# Patient Record
Sex: FEMALE | Race: WHITE | Employment: OTHER | URBAN - METROPOLITAN AREA
[De-identification: names, ages, dates, MRNs, and addresses within clinical notes are randomized per-mention and may not be internally consistent; named-entity substitution may affect disease eponyms.]

---

## 2017-04-14 PROBLEM — R07.9 CHEST PAIN: Status: ACTIVE | Noted: 2017-04-14

## 2017-04-16 PROBLEM — M89.8X1 SHOULDER BLADE PAIN: Status: ACTIVE | Noted: 2017-04-14

## 2017-11-30 ENCOUNTER — HOSPITAL ENCOUNTER (OUTPATIENT)
Dept: OTHER | Age: 77
Discharge: HOME | End: 2017-11-30
Attending: ORTHOPAEDIC SURGERY | Admitting: ORTHOPAEDIC SURGERY

## 2017-12-01 ENCOUNTER — HOSPITAL ENCOUNTER (OUTPATIENT)
Dept: OTHER | Age: 77
Discharge: HOME | End: 2017-12-01
Attending: INTERNAL MEDICINE | Admitting: INTERNAL MEDICINE

## 2017-12-11 LAB
ALBUMIN SERPL-MCNC: 3.2 GM/DL (ref 3.4–5)
ALP BLD-CCNC: 70 IU/L (ref 40–128)
ALT SERPL-CCNC: 11 U/L (ref 10–40)
ANION GAP SERPL CALCULATED.3IONS-SCNC: 13 MMOL/L (ref 4–16)
AST SERPL-CCNC: 15 IU/L (ref 15–37)
BILIRUB SERPL-MCNC: 0.7 MG/DL (ref 0–1)
BUN BLDV-MCNC: 12 MG/DL (ref 6–23)
CALCIUM SERPL-MCNC: 8.7 MG/DL (ref 8.3–10.6)
CHLORIDE BLD-SCNC: 100 MMOL/L (ref 99–110)
CO2: 26 MMOL/L (ref 21–32)
CREAT SERPL-MCNC: 0.7 MG/DL (ref 0.6–1.1)
GFR AFRICAN AMERICAN: >60 ML/MIN/1.73M2
GFR NON-AFRICAN AMERICAN: >60 ML/MIN/1.73M2
GLUCOSE BLD-MCNC: 111 MG/DL (ref 70–99)
HCT VFR BLD CALC: 31.6 % (ref 37–47)
HEMOGLOBIN: 10.3 GM/DL (ref 12.5–16)
MCH RBC QN AUTO: 30.2 PG (ref 27–31)
MCHC RBC AUTO-ENTMCNC: 32.6 % (ref 32–36)
MCV RBC AUTO: 92.7 FL (ref 78–100)
PDW BLD-RTO: 13.5 % (ref 11.7–14.9)
PLATELET # BLD: 277 K/CU MM (ref 140–440)
PMV BLD AUTO: 10.5 FL (ref 7.5–11.1)
POTASSIUM SERPL-SCNC: 4 MMOL/L (ref 3.5–5.1)
RBC # BLD: 3.41 M/CU MM (ref 4.2–5.4)
SODIUM BLD-SCNC: 139 MMOL/L (ref 135–145)
TOTAL PROTEIN: 6 GM/DL (ref 6.4–8.2)
WBC # BLD: 7.1 K/CU MM (ref 4–10.5)

## 2022-03-13 ENCOUNTER — HOSPITAL ENCOUNTER (EMERGENCY)
Age: 82
Discharge: HOME | End: 2022-03-13
Attending: EMERGENCY MEDICINE
Payer: MEDICARE

## 2022-03-13 ENCOUNTER — APPOINTMENT (OUTPATIENT)
Dept: CT IMAGING | Age: 82
End: 2022-03-13
Payer: MEDICARE

## 2022-03-13 VITALS
DIASTOLIC BLOOD PRESSURE: 98 MMHG | HEIGHT: 64 IN | RESPIRATION RATE: 18 BRPM | WEIGHT: 200 LBS | OXYGEN SATURATION: 96 % | BODY MASS INDEX: 34.15 KG/M2 | SYSTOLIC BLOOD PRESSURE: 143 MMHG | TEMPERATURE: 97.3 F | HEART RATE: 100 BPM

## 2022-03-13 DIAGNOSIS — R11.2 NON-INTRACTABLE VOMITING WITH NAUSEA, UNSPECIFIED VOMITING TYPE: Primary | ICD-10-CM

## 2022-03-13 LAB
ALBUMIN SERPL-MCNC: 4.5 GM/DL (ref 3.4–5)
ALP BLD-CCNC: 79 IU/L (ref 40–129)
ALT SERPL-CCNC: 10 U/L (ref 10–40)
ANION GAP SERPL CALCULATED.3IONS-SCNC: 15 MMOL/L (ref 4–16)
AST SERPL-CCNC: 17 IU/L (ref 15–37)
BACTERIA: NEGATIVE /HPF
BASOPHILS ABSOLUTE: 0 K/CU MM
BASOPHILS RELATIVE PERCENT: 0.1 % (ref 0–1)
BILIRUB SERPL-MCNC: 0.7 MG/DL (ref 0–1)
BILIRUBIN URINE: NEGATIVE MG/DL
BLOOD, URINE: ABNORMAL
BUN BLDV-MCNC: 13 MG/DL (ref 6–23)
CALCIUM SERPL-MCNC: 9.1 MG/DL (ref 8.3–10.6)
CAST TYPE: ABNORMAL /HPF
CHLORIDE BLD-SCNC: 104 MMOL/L (ref 99–110)
CLARITY: CLEAR
CO2: 20 MMOL/L (ref 21–32)
COLOR: YELLOW
CREAT SERPL-MCNC: 0.6 MG/DL (ref 0.6–1.1)
CRYSTAL TYPE: NEGATIVE /HPF
DIFFERENTIAL TYPE: ABNORMAL
EOSINOPHILS ABSOLUTE: 0 K/CU MM
EOSINOPHILS RELATIVE PERCENT: 0.1 % (ref 0–3)
EPITHELIAL CELLS, UA: 5 /HPF
GFR AFRICAN AMERICAN: >60 ML/MIN/1.73M2
GFR NON-AFRICAN AMERICAN: >60 ML/MIN/1.73M2
GLUCOSE BLD-MCNC: 136 MG/DL (ref 70–99)
GLUCOSE, URINE: NEGATIVE MG/DL
HCT VFR BLD CALC: 43 % (ref 37–47)
HEMOGLOBIN: 13.7 GM/DL (ref 12.5–16)
IMMATURE NEUTROPHIL %: 2.2 % (ref 0–0.43)
KETONES, URINE: NEGATIVE MG/DL
LEUKOCYTE ESTERASE, URINE: NEGATIVE
LIPASE: 18 IU/L (ref 13–60)
LYMPHOCYTES ABSOLUTE: 0.5 K/CU MM
LYMPHOCYTES RELATIVE PERCENT: 5.9 % (ref 24–44)
MCH RBC QN AUTO: 28.7 PG (ref 27–31)
MCHC RBC AUTO-ENTMCNC: 31.9 % (ref 32–36)
MCV RBC AUTO: 90 FL (ref 78–100)
MONOCYTES ABSOLUTE: 1 K/CU MM
MONOCYTES RELATIVE PERCENT: 12 % (ref 0–4)
NITRITE URINE, QUANTITATIVE: NEGATIVE
PDW BLD-RTO: 15.3 % (ref 11.7–14.9)
PH, URINE: 5.5 (ref 5–8)
PLATELET # BLD: 261 K/CU MM (ref 140–440)
PMV BLD AUTO: 10.2 FL (ref 7.5–11.1)
POTASSIUM SERPL-SCNC: 4 MMOL/L (ref 3.5–5.1)
PROTEIN UA: NEGATIVE MG/DL
RBC # BLD: 4.78 M/CU MM (ref 4.2–5.4)
RBC URINE: 15 /HPF (ref 0–6)
SEGMENTED NEUTROPHILS ABSOLUTE COUNT: 6.5 K/CU MM
SEGMENTED NEUTROPHILS RELATIVE PERCENT: 79.7 % (ref 36–66)
SODIUM BLD-SCNC: 139 MMOL/L (ref 135–145)
SPECIFIC GRAVITY UA: 1.03 (ref 1–1.03)
TOTAL IMMATURE NEUTOROPHIL: 0.18 K/CU MM
TOTAL PROTEIN: 7.8 GM/DL (ref 6.4–8.2)
TROPONIN T: <0.01 NG/ML
UROBILINOGEN, URINE: 0.2 MG/DL (ref 0.2–1)
WBC # BLD: 8.1 K/CU MM (ref 4–10.5)
WBC UA: <0 /HPF (ref 0–5)

## 2022-03-13 PROCEDURE — 85025 COMPLETE CBC W/AUTO DIFF WBC: CPT

## 2022-03-13 PROCEDURE — 2580000003 HC RX 258: Performed by: EMERGENCY MEDICINE

## 2022-03-13 PROCEDURE — 2500000003 HC RX 250 WO HCPCS: Performed by: EMERGENCY MEDICINE

## 2022-03-13 PROCEDURE — 6360000002 HC RX W HCPCS: Performed by: EMERGENCY MEDICINE

## 2022-03-13 PROCEDURE — 84484 ASSAY OF TROPONIN QUANT: CPT

## 2022-03-13 PROCEDURE — 80053 COMPREHEN METABOLIC PANEL: CPT

## 2022-03-13 PROCEDURE — 96374 THER/PROPH/DIAG INJ IV PUSH: CPT

## 2022-03-13 PROCEDURE — 74177 CT ABD & PELVIS W/CONTRAST: CPT

## 2022-03-13 PROCEDURE — 96375 TX/PRO/DX INJ NEW DRUG ADDON: CPT

## 2022-03-13 PROCEDURE — 93005 ELECTROCARDIOGRAM TRACING: CPT | Performed by: EMERGENCY MEDICINE

## 2022-03-13 PROCEDURE — 6360000004 HC RX CONTRAST MEDICATION: Performed by: EMERGENCY MEDICINE

## 2022-03-13 PROCEDURE — 83690 ASSAY OF LIPASE: CPT

## 2022-03-13 PROCEDURE — 81001 URINALYSIS AUTO W/SCOPE: CPT

## 2022-03-13 PROCEDURE — 99282 EMERGENCY DEPT VISIT SF MDM: CPT

## 2022-03-13 RX ORDER — 0.9 % SODIUM CHLORIDE 0.9 %
20 VIAL (ML) INJECTION
Status: COMPLETED | OUTPATIENT
Start: 2022-03-13 | End: 2022-03-13

## 2022-03-13 RX ORDER — ONDANSETRON 2 MG/ML
4 INJECTION INTRAMUSCULAR; INTRAVENOUS EVERY 6 HOURS PRN
Status: DISCONTINUED | OUTPATIENT
Start: 2022-03-13 | End: 2022-03-13 | Stop reason: HOSPADM

## 2022-03-13 RX ORDER — ONDANSETRON 4 MG/1
4 TABLET, ORALLY DISINTEGRATING ORAL EVERY 8 HOURS PRN
Qty: 15 TABLET | Refills: 0 | Status: SHIPPED | OUTPATIENT
Start: 2022-03-13 | End: 2022-03-13 | Stop reason: SDUPTHER

## 2022-03-13 RX ORDER — 0.9 % SODIUM CHLORIDE 0.9 %
1000 INTRAVENOUS SOLUTION INTRAVENOUS ONCE
Status: COMPLETED | OUTPATIENT
Start: 2022-03-13 | End: 2022-03-13

## 2022-03-13 RX ORDER — ONDANSETRON 4 MG/1
4 TABLET, ORALLY DISINTEGRATING ORAL EVERY 8 HOURS PRN
Qty: 15 TABLET | Refills: 0 | Status: SHIPPED | OUTPATIENT
Start: 2022-03-13

## 2022-03-13 RX ADMIN — SODIUM CHLORIDE 1000 ML: 9 INJECTION, SOLUTION INTRAVENOUS at 15:34

## 2022-03-13 RX ADMIN — ONDANSETRON 4 MG: 2 INJECTION INTRAMUSCULAR; INTRAVENOUS at 15:34

## 2022-03-13 RX ADMIN — IOPAMIDOL 75 ML: 755 INJECTION, SOLUTION INTRAVENOUS at 16:58

## 2022-03-13 RX ADMIN — FAMOTIDINE 20 MG: 10 INJECTION INTRAVENOUS at 15:33

## 2022-03-13 RX ADMIN — Medication 20 ML: at 16:59

## 2022-03-13 NOTE — Clinical Note
Agata Castro was seen and treated in our emergency department on 3/13/2022. She may return to work on 03/15/2022. If you have any questions or concerns, please don't hesitate to call.       Billie Mason MD

## 2022-03-13 NOTE — ED NOTES
Discharge instructions given per Rod Wise RN, pt voiced understanding. Escorted to discharge area without incident.       Charlee Merino RN  03/13/22 2137

## 2022-03-13 NOTE — ED PROVIDER NOTES
Emergency Department Encounter  [address]    Patient: Jacques Locke  MRN: [id number]  : 1940  Date of Evaluation: 3/13/2022  ED Provider: Aicha Miranad MD    Chief Complaint       Chief Complaint   Patient presents with    Emesis     PAT Locke is a [de-identified] y.o. female who presents to the emergency department for evaluation of nausea and vomiting. Patient reports been usual state of health until 3:00 in the morning when symptoms started. Relates reports that multiple episodes of nonbloody nonbilious vomiting since that time. Did try to take a medication for gastroesophageal reflux but was unable to keep that down. Patient denies having diarrhea, fevers, chest pain, shortness of breath or other complaints. No recent changes of diet or medications and no sick contacts. Patient denies any focal abdominal pain or discomfort. Does have history of hysterectomy appendectomy and cholecystectomy    ROS:     At least 10 systems reviewed and otherwise acutely negative except as in the [address]     Past History     Past Medical History:   Diagnosis Date    CAD (coronary artery disease)     Colon polyps 2014    Diverticulosis 2014    HTN (hypertension)     Hypothyroidism     Nausea & vomiting     Rheumatoid arthritis ([facility] [address]      Past Surgical History:   Procedure Laterality Date    APPENDECTOMY      BACK SURGERY      CHOLECYSTECTOMY      COLONOSCOPY  2014    CORONARY ANGIOPLASTY WITH STENT PLACEMENT      TONSILLECTOMY       Social History     Socioeconomic History    Marital status: Legally      Spouse name: Not on file    Number of children: Not on file    Years of education: Not on file    Highest education level: Not on file   Occupational History    Not on file   Tobacco Use    Smoking status: Former Smoker     Quit date: 10/23/1986     Years since quittin.4    Smokeless tobacco: Never Used   Substance and Sexual Activity    Alcohol use: No    Drug use: No    Sexual activity: Not on file   Other Topics Concern    Not on file   Social History Narrative    Not on file     Social Determinants of Health     Financial Resource Strain:     Difficulty of Paying Living Expenses: Not on file   Food Insecurity:     Worried About Running Out of Food in the Last Year: Not on file    Shaina of Food in the Last Year: Not on file   Transportation Needs:     Lack of Transportation (Medical): Not on file    Lack of Transportation (Non-Medical): Not on file   Physical Activity:     Days of Exercise per Week: Not on file    Minutes of Exercise per Session: Not on file   Stress:     Feeling of Stress : Not on file   Social Connections:     Frequency of Communication with Friends and Family: Not on file    Frequency of Social Gatherings with Friends and Family: Not on file    Attends Temple Services: Not on file    Active Member of [address] [place] [address] or Organizations: Not on file    Attends Club or Organization Meetings: Not on file    Marital Status: Not on file   Intimate Partner Violence:     Fear of Current or Ex-Partner: Not on file    Emotionally Abused: Not on file    Physically Abused: Not on file    Sexually Abused: Not on file   Housing Stability:     Unable to Pay for Housing in the Last Year: Not on file    Number of [place] in the Last Year: Not on file    Unstable Housing in the Last Year: Not on file       Medications/Allergies     Previous Medications    ATORVASTATIN (LIPITOR) 20 MG TABLET    Take 1 tablet by mouth nightly For cholesterol per heart doctor    CLOPIDOGREL (PLAVIX) 75 MG TABLET    Take 75 mg by mouth daily. GABAPENTIN (NEURONTIN) 300 MG CAPSULE    Take 1 capsule by mouth 3 times daily for 30 doses For neuropathic pain from muscle injury    LEVOTHYROXINE (SYNTHROID) 112 MCG TABLET    Take 200 mcg by mouth Daily     LIDOCAINE (LIDODERM) 5 %    Place 1 patch onto the skin daily 12 hours on, 12 hours off. LISINOPRIL (PRINIVIL;ZESTRIL) 10 MG TABLET    Take 20 mg by mouth daily     NAPROXEN (NAPROSYN) 375 MG TABLET    Take 1 tablet by mouth 2 times daily as needed for Pain    PANTOPRAZOLE (PROTONIX) 40 MG TABLET    Take 40 mg by mouth 2 times daily    SUCRALFATE (CARAFATE) 1 GM/10ML SUSPENSION    Take 1 g by mouth 4 times daily (before meals and nightly)    TIZANIDINE (ZANAFLEX) 2 MG TABLET    Take 1 tablet by mouth 3 times daily as needed (shoulder pain)    TRAMADOL (ULTRAM) 50 MG TABLET    Take 50 mg by mouth every 6 hours as needed for Pain. TRAMADOL (ULTRAM) 50 MG TABLET    Take 2 tablets by mouth 4 times daily as needed for Pain     Allergies   Allergen Reactions    Codeine Other (See Comments)     Made patient feel like she was having a heart attack    Phenergan [Promethazine Hcl] Other (See Comments)     Muscle spasm/ jerks    Indomethacin Er Other (See Comments)     Says they think is what gave her a hiatal hernia    Darvocet A500 [Propoxyphene N-Acetaminophen]     Demerol Hcl [Meperidine]     Fentanyl     Morphine     Percocet [Oxycodone-Acetaminophen]     Tramadol     Vicodin Hp [Hydrocodone-Acetaminophen]     Aspirin Nausea And Vomiting        Physical Exam       ED Triage Vitals [03/13/22 1524]   BP Temp Temp Source Pulse Resp SpO2 Height Weight   (!) 143/98 97.3 °F (36.3 °C) Infrared 100 18 96 % 5' 4\" (1.626 m) 200 lb (90.7 kg)     GENERAL APPEARANCE: Awake and alert. Cooperative. No acute distress. HEAD: Normocephalic. Atraumatic. EYES: Sclera anicteric. Pupils equal round reactive to light extraocular movements are intact  ENT: Tolerates saliva. No trismus. Moist mucous membranes  NECK: Supple. Trachea midline. No meningismus  CARDIO: RRR. Radial pulse 2+. No murmurs rubs or gallops appreciated  LUNGS: Respirations unlabored. CTAB. No accessory muscle usage noted. No wheezes rales rhonchi or stridor. ABDOMEN: Soft. Non-distended. Non-tender.   No tenderness in right upper quadrant or right lower quadrant to deep palpation  EXTREMITIES: No acute deformities. No unilateral leg swelling or tenderness behind either one of calves  SKIN: Warm and dry. No erythema edema or rashes appreciated  NEUROLOGICAL:  Cranial nerves II through XII grossly intact. No gross facial drooping. Moves all 4 extremities spontaneously. PSYCHIATRIC: Normal mood. Alert and oriented x3. No reported active suicidality or homicidality.     Diagnostics   Labs:  Results for orders placed or performed during the hospital encounter of 03/13/22   CBC with Auto Differential   Result Value Ref Range    WBC 8.1 4.0 - 10.5 K/CU MM    RBC 4.78 4.2 - 5.4 M/CU MM    Hemoglobin 13.7 12.5 - 16.0 GM/DL    Hematocrit 43.0 37 - 47 %    MCV 90.0 78 - 100 FL    MCH 28.7 27 - 31 PG    MCHC 31.9 (L) 32.0 - 36.0 %    RDW 15.3 (H) 11.7 - 14.9 %    Platelets 835 991 - 637 K/CU MM    MPV 10.2 7.5 - 11.1 FL    Differential Type AUTOMATED DIFFERENTIAL     Segs Relative 79.7 (H) 36 - 66 %    Lymphocytes % 5.9 (L) 24 - 44 %    Monocytes % 12.0 (H) 0 - 4 %    Eosinophils % 0.1 0 - 3 %    Basophils % 0.1 0 - 1 %    Segs Absolute 6.5 K/CU MM    Lymphocytes Absolute 0.5 K/CU MM    Monocytes Absolute 1.0 K/CU MM    Eosinophils Absolute 0.0 K/CU MM    Basophils Absolute 0.0 K/CU MM    Immature Neutrophil % 2.2 (H) 0 - 0.43 %    Total Immature Neutrophil 0.18 K/CU MM   Comprehensive Metabolic Panel w/ Reflex to MG   Result Value Ref Range    Sodium 139 135 - 145 MMOL/L    Potassium 4.0 3.5 - 5.1 MMOL/L    Chloride 104 99 - 110 mMol/L    CO2 20 (L) 21 - 32 MMOL/L    BUN 13 6 - 23 MG/DL    CREATININE 0.6 0.6 - 1.1 MG/DL    Glucose 136 (H) 70 - 99 MG/DL    Calcium 9.1 8.3 - 10.6 MG/DL    Albumin 4.5 3.4 - 5.0 GM/DL    Total Protein 7.8 6.4 - 8.2 GM/DL    Total Bilirubin 0.7 0.0 - 1.0 MG/DL    ALT 10 10 - 40 U/L    AST 17 15 - 37 IU/L    Alkaline Phosphatase 79 40 - 129 IU/L    GFR Non-African American >60 >60 mL/min/1.73m2    GFR African American >60 >60 mL/min/1.73m2    Anion Gap 15 4 - 16   Lipase   Result Value Ref Range    Lipase 18 13 - 60 IU/L   Troponin   Result Value Ref Range    Troponin T <0.010 <0.01 NG/ML   Urinalysis with Microscopic   Result Value Ref Range    Color, UA YELLOW YELLOW    Clarity, UA CLEAR CLEAR    Glucose, Urine NEGATIVE NEGATIVE MG/DL    Bilirubin Urine NEGATIVE NEGATIVE MG/DL    Ketones, Urine NEGATIVE NEGATIVE MG/DL    Specific Gravity, UA 1.026 1.001 - 1.035    Blood, Urine MODERATE NUMBER OR AMOUNT OBSERVED (A) NEGATIVE    pH, Urine 5.5 5.0 - 8.0    Protein, UA NEGATIVE NEGATIVE MG/DL    Urobilinogen, Urine 0.2 0.2 - 1.0 MG/DL    Nitrite Urine, Quantitative NEGATIVE NEGATIVE    Leukocyte Esterase, Urine NEGATIVE NEGATIVE    RBC, UA 15 (H) 0 - 6 /HPF    WBC, UA <0 (L) 0 - 5 /HPF    Epithelial Cells, UA 5 /HPF    Cast Type NO CAST FORMS SEEN NO CAST FORMS SEEN /HPF    Bacteria, UA NEGATIVE NEGATIVE /HPF    Crystal Type NEGATIVE NEGATIVE /HPF   EKG 12 Lead   Result Value Ref Range    Ventricular Rate 98 BPM    Atrial Rate 98 BPM    P-R Interval 232 ms    QRS Duration 90 ms    Q-T Interval 346 ms    QTc Calculation (Bazett) 441 ms    P Axis 30 degrees    R Axis -61 degrees    T Axis 21 degrees    Diagnosis       Sinus rhythm with 1st degree AV block  Possible Left atrial enlargement  Left anterior fascicular block  Possible Anterolateral infarct , age undetermined  Abnormal ECG  When compared with ECG of 14-APR-2017 22:59,  Borderline criteria for Anterolateral infarct are now present       Radiographs:  CT ABDOMEN PELVIS W IV CONTRAST Additional Contrast? None    Result Date: 3/13/2022  EXAMINATION: CT OF THE ABDOMEN AND PELVIS WITH CONTRAST 3/13/2022 4:45 pm TECHNIQUE: CT of the abdomen and pelvis was performed with the administration of intravenous contrast. Multiplanar reformatted images are provided for review.  Dose modulation, iterative reconstruction, and/or weight based adjustment of the mA/kV was utilized to reduce the radiation dose to as low as reasonably achievable. COMPARISON: None. HISTORY: ORDERING SYSTEM PROVIDED HISTORY: NV TECHNOLOGIST PROVIDED HISTORY: Additional Contrast?->None Reason for exam:->NV Decision Support Exception - unselect if not a suspected or confirmed emergency medical condition->Emergency Medical Condition (MA) Reason for Exam: NV Relevant Medical/Surgical History: hx hyster, appy, yfn / 75cc [id number] FINDINGS: Lower Chest: Minimal atelectasis in the visualized lung bases. Small to moderate hiatal hernia. Coronary artery calcifications. Organs: Prior cholecystectomy. No biliary ductal dilatation. Liver, pancreas, spleen, adrenal glands, and kidneys unremarkable. No hydronephrosis or urinary tract calculus. GI/Bowel: No evidence of bowel obstruction. Extensive sigmoid predominant colonic diverticulosis. No findings to suggest acute diverticulitis. Prior appendectomy. Pelvis: Urinary bladder decompressed with tiny locules of intraluminal gas likely reflecting recent instrumentation. Uterus and adnexal structures unremarkable. Peritoneum/Retroperitoneum: No free air or free fluid. Normal abdominal aortic caliber with minimal calcific atherosclerosis. No pathologically enlarged lymph node. Bones/Soft Tissues: No acute osseous or soft tissue abnormality. Small uncomplicated fat containing right inguinal hernia. Moderate to severe degenerative changes in the lumbar spine. Osteopenia. 1. No acute abdominopelvic abnormality. 2. Colonic diverticulosis. 3. Small to moderate hiatal hernia. Procedures/EKG:   Patient screening EKG is interpreted by me sinus rhythm with first-degree AV block rate 90   no ST elevation no ST depression I appreciate no acute ischemia or infarctions EKG no old EKGs with which to compare    ED Course and MDM   In brief, Myron Mcguire is a [de-identified] y.o. female who presented to the emergency department for evaluation of nausea and vomiting.   Based the patient's history and physical would be concern for possible enterovirus. Other possibilities patient symptoms do include colitis, diverticular disease atypical cardiac disease among others. Patient peers clinically quite well in no acute distress or discomfort on examination    I did review patient's imaging studies and laboratory work as noted above. On reevaluation patient was resting comfortably. I discussed the findings with patient and family at bedside. She tolerated p.o. challenge. Recommend close follow-up with primary care physician or referral physician next 24 to 48 hours. Return precautions were discussed with her include but not limited to abdominal pain, inability tolerating by mouth, shortness of breath or any other concerning symptoms she did express verbal understanding of these instructions and does feel comfortable to be discharged home    ED Medication Orders (From admission, onward)    Start Ordered     Status Ordering Provider    03/13/22 1644 03/13/22 1644  sodium chloride (PF) 0.9 % injection 20 mL  IMG ONCE PRN         Last MAR action: Given - by Jair Summers on 03/13/22 at [facility]    03/13/22 1644 03/13/22 1644  iopamidol (ISOVUE-370) 76 % injection 75 mL  IMG ONCE PRN         Last MAR action: Given - by Jair Summers on 03/13/22 at [facility]    03/13/22 1530 03/13/22 1528  0.9 % sodium chloride bolus  ONCE         Last MAR action: Stopped - by AZ ULLOA on 03/13/22 at 577 [facility]    03/13/22 1530 03/13/22 1528  famotidine (PEPCID) injection 20 mg  ONCE         Last MAR action: Given - by Michelet Failing on 03/13/22 at [facility] [facility]    03/13/22 1528 03/13/22 1528  ondansetron (ZOFRAN) injection 4 mg  EVERY 6 HOURS PRN         Last MAR action: Given - by Michelet Failing on 03/13/22 at 1534 [facility]          Final Impression      1.  Non-intractable vomiting with nausea, unspecified vomiting type      DISPOSITION           This patient was cared for in the setting of the COVID-19 pandemic, with nationwide stress on resources and staffing.     (Please note that portions of this note may have been completed with a voice recognition program. Efforts were made to edit the dictations but occasionally words are mis-transcribed.)    Neyda Rea MD  [address] Poole MD  03/13/22 0283

## 2022-03-14 LAB
EKG ATRIAL RATE: 98 BPM
EKG DIAGNOSIS: NORMAL
EKG P AXIS: 30 DEGREES
EKG P-R INTERVAL: 232 MS
EKG Q-T INTERVAL: 346 MS
EKG QRS DURATION: 90 MS
EKG QTC CALCULATION (BAZETT): 441 MS
EKG R AXIS: -61 DEGREES
EKG T AXIS: 21 DEGREES
EKG VENTRICULAR RATE: 98 BPM

## 2022-03-14 PROCEDURE — 93010 ELECTROCARDIOGRAM REPORT: CPT | Performed by: INTERNAL MEDICINE

## 2025-03-03 ENCOUNTER — INITIAL CONSULT (OUTPATIENT)
Dept: ONCOLOGY | Age: 85
End: 2025-03-03

## 2025-03-03 ENCOUNTER — HOSPITAL ENCOUNTER (OUTPATIENT)
Dept: INFUSION THERAPY | Age: 85
Discharge: HOME | End: 2025-03-03

## 2025-03-03 VITALS
BODY MASS INDEX: 32.71 KG/M2 | HEIGHT: 64 IN | WEIGHT: 191.6 LBS | OXYGEN SATURATION: 100 % | SYSTOLIC BLOOD PRESSURE: 154 MMHG | DIASTOLIC BLOOD PRESSURE: 83 MMHG | TEMPERATURE: 97.6 F | HEART RATE: 87 BPM

## 2025-03-03 DIAGNOSIS — D72.821 MONOCYTOSIS: Primary | ICD-10-CM

## 2025-03-03 DIAGNOSIS — D72.821 MONOCYTOSIS: ICD-10-CM

## 2025-03-03 LAB
ABSOLUTE BANDS: 1.41 K/UL
BANDS: 22 %
BASOPHILS # BLD: 0 K/UL
BASOPHILS NFR BLD: 0 % (ref 0–1)
EOSINOPHIL # BLD: 0.06 K/UL
EOSINOPHILS RELATIVE PERCENT: 1 % (ref 0–3)
ERYTHROCYTE [DISTWIDTH] IN BLOOD BY AUTOMATED COUNT: 16.2 % (ref 11.7–14.9)
HCT VFR BLD AUTO: 37.5 % (ref 37–47)
HGB BLD-MCNC: 11.9 G/DL (ref 12.5–16)
LDH SERPL-CCNC: 206 U/L (ref 100–190)
LYMPHOCYTES NFR BLD: 0.83 K/UL
LYMPHOCYTES RELATIVE PERCENT: 13 % (ref 24–44)
MCH RBC QN AUTO: 30.8 PG (ref 27–31)
MCHC RBC AUTO-ENTMCNC: 31.7 G/DL (ref 32–36)
MCV RBC AUTO: 97.2 FL (ref 78–100)
METAMYELOCYTES ABSOLUTE COUNT: 0.13 K/UL
METAMYELOCYTES: 2 %
MONOCYTES NFR BLD: 1.86 K/UL
MONOCYTES NFR BLD: 29 % (ref 0–4)
MYELOCYTES ABSOLUTE COUNT: 0.06 K/UL
MYELOCYTES: 1 %
NEUTROPHILS NFR BLD: 32 % (ref 36–66)
NEUTS SEG NFR BLD: 2.05 K/UL
PLATELET # BLD AUTO: 229 K/UL (ref 140–440)
PLATELET ESTIMATE: NORMAL
PMV BLD AUTO: 10 FL (ref 7.5–11.1)
RBC # BLD AUTO: 3.86 M/UL (ref 4.2–5.4)
RBC # BLD: NORMAL 10*6/UL
WBC # BLD: NORMAL 10*3/UL
WBC OTHER # BLD: 6.4 K/UL (ref 4–10.5)

## 2025-03-03 PROCEDURE — 99204 OFFICE O/P NEW MOD 45 MIN: CPT | Performed by: INTERNAL MEDICINE

## 2025-03-03 PROCEDURE — 85025 COMPLETE CBC W/AUTO DIFF WBC: CPT

## 2025-03-03 PROCEDURE — 36415 COLL VENOUS BLD VENIPUNCTURE: CPT

## 2025-03-03 PROCEDURE — 1123F ACP DISCUSS/DSCN MKR DOCD: CPT | Performed by: INTERNAL MEDICINE

## 2025-03-03 PROCEDURE — 83615 LACTATE (LD) (LDH) ENZYME: CPT

## 2025-03-03 PROCEDURE — 99202 OFFICE O/P NEW SF 15 MIN: CPT

## 2025-03-03 RX ORDER — CHOLECALCIFEROL (VITAMIN D3) 25 MCG
1 TABLET,CHEWABLE ORAL DAILY
COMMUNITY

## 2025-03-03 RX ORDER — LOSARTAN POTASSIUM 50 MG/1
50 TABLET ORAL DAILY
COMMUNITY
Start: 2025-02-06

## 2025-03-03 RX ORDER — TOLTERODINE 4 MG/1
4 CAPSULE, EXTENDED RELEASE ORAL DAILY
COMMUNITY

## 2025-03-03 RX ORDER — MYCOPHENOLATE MOFETIL 500 MG/1
1000 TABLET ORAL 2 TIMES DAILY
COMMUNITY

## 2025-03-03 RX ORDER — CHOLECALCIFEROL (VITAMIN D3) 1250 MCG
50000 CAPSULE ORAL DAILY
COMMUNITY

## 2025-03-03 RX ORDER — TRAZODONE HYDROCHLORIDE 50 MG/1
50 TABLET ORAL NIGHTLY
COMMUNITY

## 2025-03-03 ASSESSMENT — PATIENT HEALTH QUESTIONNAIRE - PHQ9
1. LITTLE INTEREST OR PLEASURE IN DOING THINGS: NOT AT ALL
SUM OF ALL RESPONSES TO PHQ QUESTIONS 1-9: 0
2. FEELING DOWN, DEPRESSED OR HOPELESS: NOT AT ALL

## 2025-03-03 NOTE — PROGRESS NOTES
MA Rooming Questions  Patient: Jhoana Angulo  MRN: [id number]    Date: 3/3/2025        New Patient        5. Did the patient have a depression screening completed today? Yes    PHQ-9 Total Score: 0 (3/3/2025  2:33 PM)       PHQ-9 Given to (if applicable):               PHQ-9 Score (if applicable):                     [] Positive     []  Negative              Does question #9 need addressed (if applicable)                     [] Yes    []  No               Roxana Tolbert CMA    
observation.     I answered all the questions and concerns for today. Thank you for allowing me to participate in the care of this very pleasant patient.    Recent imaging and labs were reviewed and discussed with the patient.

## 2025-03-13 LAB — SURGICAL PATHOLOGY REPORT: NORMAL

## 2025-04-01 ENCOUNTER — OFFICE VISIT (OUTPATIENT)
Dept: ONCOLOGY | Age: 85
End: 2025-04-01
Payer: MEDICARE

## 2025-04-01 ENCOUNTER — HOSPITAL ENCOUNTER (OUTPATIENT)
Dept: INFUSION THERAPY | Age: 85
Discharge: HOME | End: 2025-04-01
Payer: MEDICARE

## 2025-04-01 VITALS
HEART RATE: 77 BPM | SYSTOLIC BLOOD PRESSURE: 154 MMHG | DIASTOLIC BLOOD PRESSURE: 84 MMHG | TEMPERATURE: 98.1 F | OXYGEN SATURATION: 98 % | RESPIRATION RATE: 16 BRPM | WEIGHT: 190.6 LBS | HEIGHT: 64 IN | BODY MASS INDEX: 32.54 KG/M2

## 2025-04-01 DIAGNOSIS — D72.821 MONOCYTOSIS: Primary | ICD-10-CM

## 2025-04-01 PROCEDURE — 1159F MED LIST DOCD IN RCRD: CPT | Performed by: INTERNAL MEDICINE

## 2025-04-01 PROCEDURE — G8399 PT W/DXA RESULTS DOCUMENT: HCPCS | Performed by: INTERNAL MEDICINE

## 2025-04-01 PROCEDURE — 1123F ACP DISCUSS/DSCN MKR DOCD: CPT | Performed by: INTERNAL MEDICINE

## 2025-04-01 PROCEDURE — 99212 OFFICE O/P EST SF 10 MIN: CPT

## 2025-04-01 PROCEDURE — 1090F PRES/ABSN URINE INCON ASSESS: CPT | Performed by: INTERNAL MEDICINE

## 2025-04-01 PROCEDURE — 99215 OFFICE O/P EST HI 40 MIN: CPT | Performed by: INTERNAL MEDICINE

## 2025-04-01 PROCEDURE — G8417 CALC BMI ABV UP PARAM F/U: HCPCS | Performed by: INTERNAL MEDICINE

## 2025-04-01 PROCEDURE — 1036F TOBACCO NON-USER: CPT | Performed by: INTERNAL MEDICINE

## 2025-04-01 PROCEDURE — G8427 DOCREV CUR MEDS BY ELIG CLIN: HCPCS | Performed by: INTERNAL MEDICINE

## 2025-04-01 RX ORDER — LEVOTHYROXINE SODIUM 125 MCG
TABLET ORAL
COMMUNITY
Start: 2025-03-10

## 2025-04-01 RX ORDER — TRAMADOL HYDROCHLORIDE 50 MG/1
TABLET ORAL
COMMUNITY
Start: 2025-03-03

## 2025-04-01 NOTE — PROGRESS NOTES
MA Rooming Questions  Patient: Jhoana Angulo  MRN: [id number]    Date: 4/1/2025        1. Do you have any new issues?   no         2. Do you need any refills on medications?    no    3. Have you had any imaging done since your last visit?   no    4. Have you been hospitalized or seen in the emergency room since your last visit here?   no    5. Did the patient have a depression screening completed today? No    No data recorded     PHQ-9 Given to (if applicable):               PHQ-9 Score (if applicable):                     [] Positive     []  Negative              Does question #9 need addressed (if applicable)                     [] Yes    []  No               Tanika Yan CMA

## 2025-04-01 NOTE — PROGRESS NOTES
Patient Name:  Jhoana Angulo  Patient :  1940  Patient MRN:  [id number]     Primary Oncologist: Matthew Mckeon MD  Referring Provider: Jaz Barba MD     Date of Service: 2025      Chief Complaint:    Chief Complaint   Patient presents with    Follow-up     Patient Active Problem List:     Shoulder blade pain    HPI:   Jhoana Angulo is a 84 y.o. female with medical history significant for HTN, hypothyroidism, CAD and rheumatoid arthritis, on mycophenolate, referred to me on 3/3/25 for evaluation of increased in myelocytes and monocytosis.    She has rheumatoid arthritis and she has been following with rheumatologist at [facility]. She is on mycophenolate since ~ . She is also on prednisone off and on since 2024 for rheumatoid flare ups.     She was noted to have increased myelocytes and monocytosis since 2024. They have been persistently elevated since then.     Her hemoglobin, total WBC count and platelet counts were normal.     Since she is found to have persistent myelocytes and monocytosis, she was referred to me for further evaluation.     Laboratory work ups done on 3/3/25 showed absolute monocytosis (Monocyte 29%, absolute monocyte count 1.86) and increased myelocyte in peripheral blood (1%). LDH was 206. 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 (SRSF2 mutations can be seen in various myeloid neoplasms and are associated with a poor prognosis in MDS, AML, PMF and PV. TET2 mutations can be seen in various myeloid neoplasms and are associated with an increased risk found to hypomethylating agents and a poor prognosis in MDS after hematopoietic stem cell transplantation. Co-mutation of SRSF2 and TET 2 is reported to be highly predictive of a myeloid neoplasm characterized by myelodysplasia and monocytosis, including, but not limited to CMML).    On 2025, she presented to me for follow-up.  She was referred to me for increased absolute monocytosis and increased

## 2025-04-02 ENCOUNTER — CLINICAL DOCUMENTATION (OUTPATIENT)
Dept: ONCOLOGY | Age: 85
End: 2025-04-02

## 2025-04-02 NOTE — PROGRESS NOTES
IR bone marrow scheduled [facility] 4/7/25 07:30 AM for 09:00 AM patient will check with prescribing doctor regarding holding blood thinners.   Patient voiced understanding

## 2025-04-04 NOTE — PROGRESS NOTES
IR Procedure at [facility]: Left message for patient to arrive at 0930 at [facility] on 4/10/2025 for her procedure at 1100, also went over below instructions and told patient to take her medications as scheduled,except for plavix, I asked patient to call her Dr about stopping it and to call me back and let me know what she was told. Patient called back , last dose of plavix was on 4/2/2025.    NPO at Midnight      Follow your directions as prescribed by the doctor for your procedure and medications.  3.   Consult your provider as to when to stop blood thinner  4.   Do not take any pain medication within 6 hours of your procedure  5.   Do not drink any alcoholic beverages or use any street drugs 24 hours before procedure.  6.   Please wear simple, loose fitting clothing to the hospital.  Do not bring valuables (money,             credit cards, checkbooks, etc.)     7.   If you  have a Living Will and Durable Power of  for Healthcare, please bring in a copy.  8.   Please bring picture ID,  insurance card, paperwork from the doctors office            (H & P, Consent,  & card for implantable devices).  9.   Report to the information desk on the ground floor.  10. Take a shower the night before or morning of your procedure, do not apply any lotion, oil or powder.  11. You will need a responsible adult

## 2025-04-09 DIAGNOSIS — D72.821 MONOCYTOSIS: Primary | ICD-10-CM

## 2025-04-10 ENCOUNTER — HOSPITAL ENCOUNTER (OUTPATIENT)
Dept: INTERVENTIONAL RADIOLOGY/VASCULAR | Age: 85
Discharge: HOME | End: 2025-04-10
Payer: MEDICARE

## 2025-04-10 VITALS
WEIGHT: 190 LBS | BODY MASS INDEX: 32.44 KG/M2 | DIASTOLIC BLOOD PRESSURE: 60 MMHG | SYSTOLIC BLOOD PRESSURE: 132 MMHG | HEART RATE: 70 BPM | HEIGHT: 64 IN | OXYGEN SATURATION: 97 %

## 2025-04-10 DIAGNOSIS — D72.821 MONOCYTOSIS: ICD-10-CM

## 2025-04-10 LAB
ERYTHROCYTE [DISTWIDTH] IN BLOOD BY AUTOMATED COUNT: 14 % (ref 11.7–14.9)
HCT VFR BLD AUTO: 39.3 % (ref 37–47)
HGB BLD-MCNC: 12.8 G/DL (ref 12.5–16)
INR PPP: 1.1
MCH RBC QN AUTO: 30.4 PG (ref 27–31)
MCHC RBC AUTO-ENTMCNC: 32.6 G/DL (ref 32–36)
MCV RBC AUTO: 93.3 FL (ref 78–100)
PARTIAL THROMBOPLASTIN TIME: 29.9 SEC (ref 25.1–37.1)
PLATELET # BLD AUTO: 231 K/UL (ref 140–440)
PMV BLD AUTO: 10.6 FL (ref 7.5–11.1)
PROTHROMBIN TIME: 14.3 SEC (ref 11.7–14.5)
RBC # BLD AUTO: 4.21 M/UL (ref 4.2–5.4)
RETICS # AUTO: 0.13 M/UL
RETICS/RBC NFR AUTO: 3 % (ref 0.2–2)
WBC OTHER # BLD: 5 K/UL (ref 4–10.5)

## 2025-04-10 PROCEDURE — 85045 AUTOMATED RETICULOCYTE COUNT: CPT

## 2025-04-10 PROCEDURE — 6360000002 HC RX W HCPCS

## 2025-04-10 PROCEDURE — 85027 COMPLETE CBC AUTOMATED: CPT

## 2025-04-10 PROCEDURE — 77002 NEEDLE LOCALIZATION BY XRAY: CPT

## 2025-04-10 PROCEDURE — 6360000002 HC RX W HCPCS: Performed by: RADIOLOGY

## 2025-04-10 PROCEDURE — 38222 DX BONE MARROW BX & ASPIR: CPT

## 2025-04-10 PROCEDURE — C1830 POWER BONE MARROW BX NEEDLE: HCPCS

## 2025-04-10 PROCEDURE — 85610 PROTHROMBIN TIME: CPT

## 2025-04-10 PROCEDURE — 85730 THROMBOPLASTIN TIME PARTIAL: CPT

## 2025-04-10 RX ORDER — MIDAZOLAM HYDROCHLORIDE 2 MG/2ML
INJECTION, SOLUTION INTRAMUSCULAR; INTRAVENOUS PRN
Status: COMPLETED | OUTPATIENT
Start: 2025-04-10 | End: 2025-04-10

## 2025-04-10 RX ORDER — DIPHENHYDRAMINE HYDROCHLORIDE 50 MG/ML
INJECTION, SOLUTION INTRAMUSCULAR; INTRAVENOUS PRN
Status: COMPLETED | OUTPATIENT
Start: 2025-04-10 | End: 2025-04-10

## 2025-04-10 RX ORDER — LIDOCAINE HYDROCHLORIDE AND EPINEPHRINE 10; 10 MG/ML; UG/ML
INJECTION, SOLUTION INFILTRATION; PERINEURAL PRN
Status: COMPLETED | OUTPATIENT
Start: 2025-04-10 | End: 2025-04-10

## 2025-04-10 RX ORDER — LIDOCAINE HYDROCHLORIDE 10 MG/ML
INJECTION, SOLUTION EPIDURAL; INFILTRATION; INTRACAUDAL; PERINEURAL PRN
Status: COMPLETED | OUTPATIENT
Start: 2025-04-10 | End: 2025-04-10

## 2025-04-10 RX ADMIN — LIDOCAINE HYDROCHLORIDE 8 ML: 10 INJECTION, SOLUTION EPIDURAL; INFILTRATION; INTRACAUDAL; PERINEURAL at 11:52

## 2025-04-10 RX ADMIN — LIDOCAINE HYDROCHLORIDE,EPINEPHRINE BITARTRATE 8 ML: 10; .01 INJECTION, SOLUTION INFILTRATION; PERINEURAL at 11:52

## 2025-04-10 RX ADMIN — MIDAZOLAM HYDROCHLORIDE 0.5 MG: 1 INJECTION, SOLUTION INTRAMUSCULAR; INTRAVENOUS at 11:47

## 2025-04-10 RX ADMIN — DIPHENHYDRAMINE HYDROCHLORIDE 25 MG: 50 INJECTION, SOLUTION INTRAMUSCULAR; INTRAVENOUS at 11:48

## 2025-04-10 NOTE — PRE SEDATION
MD Alex   Cholecalciferol (VITAMIN D3) 1.25 MG (58920 UT) CAPS Take 1 capsule by mouth daily    Alex Naranjo MD   Cyanocobalamin (B-12) 3000 MCG CAPS Take 1 capsule by mouth daily    Alex Naranjo MD   Multiple Vitamins-Minerals (HAIR SKIN & NAILS PO) Take 1 tablet by mouth daily    Alex Naranjo MD   gabapentin (NEURONTIN) 300 MG capsule Take 1 capsule by mouth 3 times daily for 30 doses For neuropathic pain from muscle injury  Patient taking differently: Take 1 capsule by mouth 2 times daily. For neuropathic pain from muscle injury 4/17/17 3/3/25  Nilsa Marie MD   atorvastatin (LIPITOR) 20 MG tablet Take 1 tablet by mouth nightly For cholesterol per heart doctor 4/17/17   Nilsa Marie MD   pantoprazole (PROTONIX) 40 MG tablet Take 1 tablet by mouth daily    Alex Naranjo MD   clopidogrel (PLAVIX) 75 MG tablet Take 1 tablet by mouth daily    ProviderAlex MD     Coumadin Use Last 7 Days:  no  Antiplatelet drug therapy use last 7 days: no  Other anticoagulant use last 7 days: no  Additional Medication Information:  N/A      Pre-Sedation Documentation and Exam:   I have reviewed the patient's history and review of systems.    Mallampati Airway Assessment:  Mallampati Class II - (soft palate, fauces & uvula are visible)    Prior History of Anesthesia Complications:   none    ASA Classification:  Class 3 - A patient with severe systemic disease that limits activity but is not incapacitating    Sedation/ Anesthesia Plan:   intravenous sedation    Medications Planned:   fentanyl intravenously    Patient is an appropriate candidate for plan of sedation: yes    Electronically signed by Axel Shaffer MD on 4/10/2025 at 12:39 PM

## 2025-04-10 NOTE — PROGRESS NOTES
Pt arrived for scheduled bone marrow biopsy.  Vitals WNL. IV and labs obtained.  Pt is comfortable with family at bedside.  Call light within reach.

## 2025-04-10 NOTE — PROGRESS NOTES
TRANSFER - OUT REPORT:    Verbal report given to mayra/(name) on Jhoana Angulo being transferred to ir holding(unit) for routine post-op       Report consisted of patient's Situation, Background, Assessment and   Recommendations(SBAR).     Information from the following report(s) Nurse Handoff Report was reviewed with the receiving nurse.    Opportunity for questions and clarification was provided.      Patient transported with:   Registered Nurse

## 2025-04-10 NOTE — OR NURSING
Specimens collected  for bone marrow biopsy, cystology here to collect. Band aide applied to site.

## 2025-04-10 NOTE — BRIEF OP NOTE
Brief Postoperative Note      Patient: Jhoana Angulo  YOB: 1940  MRN: [id number]    Date of Procedure: 4/10/2025    Anemia    Post-Op Diagnosis: Same       Bone marrow    M. Edmund    Assistant:  * No surgical staff found *    Anesthesia: * No anesthesia type entered *    Estimated Blood Loss (mL): Minimal    Complications: None    Left iliac    Implants:  * No implants in log *      Drains: * No LDAs found *    Findings:  Infection Present At Time Of Surgery (PATOS) (choose all levels that have infection present):  No infection present  Other Findings: left hip aspiration    Electronically signed by Axel Shaffer MD on 4/10/2025 at 12:40 PM

## 2025-04-10 NOTE — PROGRESS NOTES
Dressing clean dry and intact. Patient ambulated to restroom without issue. Discharge instructions given to patient and daughter in law. All questions answered at this time. Patient wheeled to front entrance by nurse.

## 2025-04-18 LAB — SURGICAL PATHOLOGY REPORT: NORMAL

## 2025-04-24 ENCOUNTER — OFFICE VISIT (OUTPATIENT)
Dept: ONCOLOGY | Age: 85
End: 2025-04-24
Payer: MEDICARE

## 2025-04-24 ENCOUNTER — HOSPITAL ENCOUNTER (OUTPATIENT)
Dept: INFUSION THERAPY | Age: 85
Discharge: HOME | End: 2025-04-24
Payer: MEDICARE

## 2025-04-24 VITALS
HEIGHT: 64 IN | HEART RATE: 89 BPM | SYSTOLIC BLOOD PRESSURE: 150 MMHG | DIASTOLIC BLOOD PRESSURE: 79 MMHG | TEMPERATURE: 98 F | WEIGHT: 190.4 LBS | BODY MASS INDEX: 32.5 KG/M2 | OXYGEN SATURATION: 96 %

## 2025-04-24 DIAGNOSIS — D72.821 MONOCYTOSIS: Primary | ICD-10-CM

## 2025-04-24 PROCEDURE — 1126F AMNT PAIN NOTED NONE PRSNT: CPT | Performed by: INTERNAL MEDICINE

## 2025-04-24 PROCEDURE — G8417 CALC BMI ABV UP PARAM F/U: HCPCS | Performed by: INTERNAL MEDICINE

## 2025-04-24 PROCEDURE — 99214 OFFICE O/P EST MOD 30 MIN: CPT | Performed by: INTERNAL MEDICINE

## 2025-04-24 PROCEDURE — 1090F PRES/ABSN URINE INCON ASSESS: CPT | Performed by: INTERNAL MEDICINE

## 2025-04-24 PROCEDURE — G8427 DOCREV CUR MEDS BY ELIG CLIN: HCPCS | Performed by: INTERNAL MEDICINE

## 2025-04-24 PROCEDURE — 99212 OFFICE O/P EST SF 10 MIN: CPT

## 2025-04-24 PROCEDURE — 1159F MED LIST DOCD IN RCRD: CPT | Performed by: INTERNAL MEDICINE

## 2025-04-24 PROCEDURE — 1123F ACP DISCUSS/DSCN MKR DOCD: CPT | Performed by: INTERNAL MEDICINE

## 2025-04-24 PROCEDURE — 1036F TOBACCO NON-USER: CPT | Performed by: INTERNAL MEDICINE

## 2025-04-24 PROCEDURE — G8399 PT W/DXA RESULTS DOCUMENT: HCPCS | Performed by: INTERNAL MEDICINE

## 2025-04-24 NOTE — PROGRESS NOTES
MA Rooming Questions  Patient: Jhoana Angulo  MRN: [id number]    Date: 4/24/2025        1. Do you have any new issues?   no         2. Do you need any refills on medications?    no    3. Have you had any imaging done since your last visit?   no    4. Have you been hospitalized or seen in the emergency room since your last visit here?   no    5. Did the patient have a depression screening completed today? No    No data recorded     PHQ-9 Given to (if applicable):               PHQ-9 Score (if applicable):                     [] Positive     []  Negative              Does question #9 need addressed (if applicable)                     [] Yes    []  No               Sneha Winn MA      
the electronic health record. This time also includes multiple disciplinary evaluation and management of cancer as documented.

## 2025-06-02 LAB — BONE MARROW REPORT: NORMAL

## 2025-07-22 LAB
BCR-ABL QUANTITATIVE: NORMAL
FLOW CYTOMETRY: NORMAL
Lab: NORMAL

## 2025-07-23 LAB
CYTOGENETICS BM REPORT: NORMAL
FISH: NORMAL
FLOW CYTOMETRY: NORMAL

## 2025-08-26 ENCOUNTER — OFFICE VISIT (OUTPATIENT)
Dept: ONCOLOGY | Age: 85
End: 2025-08-26
Payer: MEDICARE

## 2025-08-26 ENCOUNTER — HOSPITAL ENCOUNTER (OUTPATIENT)
Dept: INFUSION THERAPY | Age: 85
Discharge: HOME | End: 2025-08-26
Payer: MEDICARE

## 2025-08-26 VITALS
BODY MASS INDEX: 32.95 KG/M2 | OXYGEN SATURATION: 97 % | RESPIRATION RATE: 16 BRPM | DIASTOLIC BLOOD PRESSURE: 93 MMHG | SYSTOLIC BLOOD PRESSURE: 147 MMHG | HEIGHT: 64 IN | WEIGHT: 193 LBS | HEART RATE: 88 BPM | TEMPERATURE: 98.1 F

## 2025-08-26 DIAGNOSIS — C93.10 CHRONIC MYELOMONOCYTIC LEUKEMIA NOT HAVING ACHIEVED REMISSION (HCC): ICD-10-CM

## 2025-08-26 DIAGNOSIS — D72.821 MONOCYTOSIS: Primary | ICD-10-CM

## 2025-08-26 DIAGNOSIS — D72.821 MONOCYTOSIS: ICD-10-CM

## 2025-08-26 LAB
BASOPHILS # BLD: 0 K/UL
BASOPHILS NFR BLD: 0 % (ref 0–1)
EOSINOPHIL # BLD: 0 K/UL
EOSINOPHILS RELATIVE PERCENT: 0 % (ref 0–3)
ERYTHROCYTE [DISTWIDTH] IN BLOOD BY AUTOMATED COUNT: 16.4 % (ref 11.7–14.9)
HCT VFR BLD AUTO: 39.3 % (ref 37–47)
HGB BLD-MCNC: 12.6 G/DL (ref 12.5–16)
LDH SERPL-CCNC: 191 U/L (ref 100–190)
LYMPHOCYTES NFR BLD: 1.62 K/UL
LYMPHOCYTES RELATIVE PERCENT: 19 % (ref 24–44)
MCH RBC QN AUTO: 29.8 PG (ref 27–31)
MCHC RBC AUTO-ENTMCNC: 32.1 G/DL (ref 32–36)
MCV RBC AUTO: 92.9 FL (ref 78–100)
MONOCYTES NFR BLD: 1.79 K/UL
MONOCYTES NFR BLD: 21 % (ref 0–5)
NEUTROPHILS NFR BLD: 60 % (ref 36–66)
NEUTS SEG NFR BLD: 5.1 K/UL
PLATELET # BLD AUTO: 220 K/UL (ref 140–440)
PLATELET ESTIMATE: NORMAL
PMV BLD AUTO: 10.4 FL (ref 7.5–11.1)
RBC # BLD AUTO: 4.23 M/UL (ref 4.2–5.4)
RBC # BLD: NORMAL 10*6/UL
WBC # BLD: NORMAL 10*3/UL
WBC OTHER # BLD: 8.5 K/UL (ref 4–10.5)

## 2025-08-26 PROCEDURE — 36415 COLL VENOUS BLD VENIPUNCTURE: CPT

## 2025-08-26 PROCEDURE — 85045 AUTOMATED RETICULOCYTE COUNT: CPT

## 2025-08-26 PROCEDURE — 1123F ACP DISCUSS/DSCN MKR DOCD: CPT | Performed by: INTERNAL MEDICINE

## 2025-08-26 PROCEDURE — G8399 PT W/DXA RESULTS DOCUMENT: HCPCS | Performed by: INTERNAL MEDICINE

## 2025-08-26 PROCEDURE — 99213 OFFICE O/P EST LOW 20 MIN: CPT | Performed by: INTERNAL MEDICINE

## 2025-08-26 PROCEDURE — 1090F PRES/ABSN URINE INCON ASSESS: CPT | Performed by: INTERNAL MEDICINE

## 2025-08-26 PROCEDURE — 1126F AMNT PAIN NOTED NONE PRSNT: CPT | Performed by: INTERNAL MEDICINE

## 2025-08-26 PROCEDURE — G8427 DOCREV CUR MEDS BY ELIG CLIN: HCPCS | Performed by: INTERNAL MEDICINE

## 2025-08-26 PROCEDURE — 83615 LACTATE (LD) (LDH) ENZYME: CPT

## 2025-08-26 PROCEDURE — 85025 COMPLETE CBC W/AUTO DIFF WBC: CPT

## 2025-08-26 PROCEDURE — G8417 CALC BMI ABV UP PARAM F/U: HCPCS | Performed by: INTERNAL MEDICINE

## 2025-08-26 PROCEDURE — 1036F TOBACCO NON-USER: CPT | Performed by: INTERNAL MEDICINE

## 2025-08-26 PROCEDURE — 99212 OFFICE O/P EST SF 10 MIN: CPT

## 2025-08-26 PROCEDURE — 1159F MED LIST DOCD IN RCRD: CPT | Performed by: INTERNAL MEDICINE

## 2025-08-26 RX ORDER — BACLOFEN 5 MG/1
5 TABLET ORAL 3 TIMES DAILY PRN
COMMUNITY
Start: 2025-08-18

## 2025-08-26 RX ORDER — DIAZEPAM 5 MG/1
5 TABLET ORAL DAILY PRN
COMMUNITY
Start: 2025-08-22

## 2025-08-26 ASSESSMENT — PATIENT HEALTH QUESTIONNAIRE - PHQ9
SUM OF ALL RESPONSES TO PHQ QUESTIONS 1-9: 0
2. FEELING DOWN, DEPRESSED OR HOPELESS: NOT AT ALL
SUM OF ALL RESPONSES TO PHQ QUESTIONS 1-9: 0
1. LITTLE INTEREST OR PLEASURE IN DOING THINGS: NOT AT ALL
SUM OF ALL RESPONSES TO PHQ QUESTIONS 1-9: 0
SUM OF ALL RESPONSES TO PHQ QUESTIONS 1-9: 0

## 2025-08-27 LAB
RETICS # AUTO: 0.09 M/UL
RETICS/RBC NFR AUTO: 2.2 % (ref 0.2–2)